# Patient Record
Sex: MALE | Race: WHITE | Employment: UNEMPLOYED | URBAN - METROPOLITAN AREA
[De-identification: names, ages, dates, MRNs, and addresses within clinical notes are randomized per-mention and may not be internally consistent; named-entity substitution may affect disease eponyms.]

---

## 2018-09-13 ENCOUNTER — APPOINTMENT (OUTPATIENT)
Dept: GENERAL RADIOLOGY | Age: 38
End: 2018-09-13
Payer: MEDICAID

## 2018-09-13 ENCOUNTER — HOSPITAL ENCOUNTER (EMERGENCY)
Age: 38
Discharge: HOME | End: 2018-09-13
Payer: MEDICAID

## 2018-09-13 VITALS
DIASTOLIC BLOOD PRESSURE: 76 MMHG | HEART RATE: 99 BPM | WEIGHT: 292 LBS | RESPIRATION RATE: 16 BRPM | BODY MASS INDEX: 37.47 KG/M2 | HEIGHT: 74 IN | TEMPERATURE: 97.9 F | OXYGEN SATURATION: 96 % | SYSTOLIC BLOOD PRESSURE: 138 MMHG

## 2018-09-13 DIAGNOSIS — S46.912A STRAIN OF LEFT SHOULDER, INITIAL ENCOUNTER: Primary | ICD-10-CM

## 2018-09-13 PROCEDURE — 99283 EMERGENCY DEPT VISIT LOW MDM: CPT

## 2018-09-13 PROCEDURE — 73030 X-RAY EXAM OF SHOULDER: CPT

## 2018-09-13 ASSESSMENT — PAIN DESCRIPTION - ORIENTATION
ORIENTATION: LEFT
ORIENTATION: LEFT

## 2018-09-13 ASSESSMENT — PAIN SCALES - GENERAL
PAINLEVEL_OUTOF10: 4
PAINLEVEL_OUTOF10: 4

## 2018-09-13 ASSESSMENT — PAIN DESCRIPTION - LOCATION
LOCATION: SHOULDER
LOCATION: SHOULDER

## 2018-09-13 ASSESSMENT — PAIN DESCRIPTION - DESCRIPTORS: DESCRIPTORS: ACHING

## 2018-09-13 ASSESSMENT — PAIN DESCRIPTION - PAIN TYPE
TYPE: ACUTE PAIN
TYPE: ACUTE PAIN

## 2018-09-16 NOTE — ED PROVIDER NOTES
Interpretation: Normal    Constitutional:  Alert, development consistent with age. Neck:  Normal ROM. Supple. Non-tender. Shoulder:  Left  global.              Tenderness: mild              Swelling: None. Deformity: no.              ROM: full range with pain. Skin:  tenderness and no erythema, rash or wounds noted. Neurovascular: Motor deficit: none. Sensory deficit: none. Pulse deficit: none. Capillary refill: normal.    Elbow:              Tenderness:  none. Swelling: None. Deformity: no.              ROM: full range of motion. Skin:  no erythema, rash or wounds noted. Lymphatics: No lymphangitis or edema noted  Neurological:  Oriented. Motor functions intact. Lab / Imaging Results   (All laboratory and radiology results have been personally reviewed by myself)  Labs:  No results found for this visit on 09/13/18. Imaging: All Radiology results interpreted by Radiologist unless otherwise noted. XR SHOULDER LEFT (MIN 2 VIEWS)   Final Result   No evidence of fracture or dislocation of the shoulder. ED Course / Medical Decision Making   Medications - No data to display         Consult(s):   None    Procedure(s):   none    MDM:   Films were obtained based on low  suspicion for bony injury as per history/physical findings . Plan is subsequently for symptom control, limited use and  immobilization with appropriate outpatient follow-up. Counseling: The emergency provider has spoken with the patient and discussed todays results, in addition to providing specific details for the plan of care and counseling regarding the diagnosis and prognosis. Questions are answered at this time and they are agreeable with the plan. Assessment      1.  Strain of left shoulder, initial encounter      Plan   Discharge to home  Patient condition is good    New Medications     Discharge

## 2021-01-05 LAB
AVERAGE GLUCOSE: NORMAL
HBA1C MFR BLD: 11.4 %

## 2021-06-23 LAB
AVERAGE GLUCOSE: NORMAL
HBA1C MFR BLD: 11.2 %

## 2021-09-27 ENCOUNTER — HOSPITAL ENCOUNTER (EMERGENCY)
Age: 41
Discharge: HOME | End: 2021-09-27
Payer: MEDICAID

## 2021-09-27 VITALS
HEART RATE: 85 BPM | BODY MASS INDEX: 35.94 KG/M2 | OXYGEN SATURATION: 94 % | RESPIRATION RATE: 16 BRPM | HEIGHT: 74 IN | DIASTOLIC BLOOD PRESSURE: 88 MMHG | WEIGHT: 280 LBS | SYSTOLIC BLOOD PRESSURE: 156 MMHG | TEMPERATURE: 96.8 F

## 2021-09-27 DIAGNOSIS — K04.7 DENTAL ABSCESS: Primary | ICD-10-CM

## 2021-09-27 PROCEDURE — 6360000002 HC RX W HCPCS: Performed by: NURSE PRACTITIONER

## 2021-09-27 PROCEDURE — 99282 EMERGENCY DEPT VISIT SF MDM: CPT

## 2021-09-27 PROCEDURE — 96372 THER/PROPH/DIAG INJ SC/IM: CPT

## 2021-09-27 RX ORDER — KETOROLAC TROMETHAMINE 30 MG/ML
30 INJECTION, SOLUTION INTRAMUSCULAR; INTRAVENOUS ONCE
Status: COMPLETED | OUTPATIENT
Start: 2021-09-27 | End: 2021-09-27

## 2021-09-27 RX ORDER — BUPRENORPHINE AND NALOXONE 8; 2 MG/1; MG/1
1 FILM, SOLUBLE BUCCAL; SUBLINGUAL DAILY
COMMUNITY

## 2021-09-27 RX ADMIN — KETOROLAC TROMETHAMINE 30 MG: 30 INJECTION, SOLUTION INTRAMUSCULAR at 11:28

## 2021-09-27 ASSESSMENT — PAIN SCALES - GENERAL
PAINLEVEL_OUTOF10: 8
PAINLEVEL_OUTOF10: 8

## 2021-09-27 ASSESSMENT — PAIN DESCRIPTION - DESCRIPTORS: DESCRIPTORS: STABBING;PRESSURE

## 2021-09-27 ASSESSMENT — PAIN DESCRIPTION - PAIN TYPE: TYPE: ACUTE PAIN

## 2021-09-27 ASSESSMENT — PAIN DESCRIPTION - ONSET: ONSET: SUDDEN

## 2021-09-27 ASSESSMENT — PAIN DESCRIPTION - FREQUENCY: FREQUENCY: CONTINUOUS

## 2021-09-27 ASSESSMENT — PAIN DESCRIPTION - PROGRESSION: CLINICAL_PROGRESSION: GRADUALLY WORSENING

## 2021-09-27 ASSESSMENT — PAIN DESCRIPTION - LOCATION: LOCATION: TEETH

## 2021-09-27 ASSESSMENT — PAIN DESCRIPTION - ORIENTATION: ORIENTATION: LEFT;LOWER

## 2021-09-27 NOTE — ED PROVIDER NOTES
[facility]  Department of Emergency Medicine   ED  Encounter Note  Admit Date/RoomTime: 2021 10:49 AM  ED Room: [facility]    NAME: Adriana Benavides  : 1980  MRN: [id number]     Chief Complaint:  Dental Pain (broken tooth left side bottom row. )    History of Present Illness        Adriana Benavides is a 46 y.o. old male who presents to the emergency department by private vehicle for sudden onset of non-traumatic left lower tooth pain, which occured 4 day(s) prior to arrival.  Since onset the symptoms have been gradually worsening and moderate in severity. Worsened by  chewing and improved by nothing. Associated Signs & Symptoms:  Facial pain and swelling. Patient reports taking clindamycin 600 mg 3 times a day since the incident with worsening of his symptoms. He states he had this prescription left over. He is a patient of [facility] dental and is unable to be seen because they are \"computers are down. \"            Onset:       Spontaneous:   yes. Following Trauma:   no.     Previous Caries:   yes. Recent Dental Procedure:   no.     ROS   Pertinent positives and negatives are stated within HPI, all other systems reviewed and are negative. Past Medical History:  has a past medical history of Cellulitis and abscess of hand, except fingers and thumb, Foreign body, and Tobacco abuse. Surgical History:  has a past surgical history that includes Ankle surgery (1998); Wrist surgery; Tonsillectomy; and Ankle surgery (Right). Social History:  reports that he has been smoking cigarettes. He has a 30.00 pack-year smoking history. He has never used smokeless tobacco. He reports previous drug use. He reports that he does not drink alcohol. Family History: family history is not on file. Allergies: Patient has no known allergies.     Physical Exam   Oxygen Saturation Interpretation: Normal.        ED Triage Vitals   BP Temp Temp Source Pulse Resp SpO2 Height Weight   09/27/21 0958 09/27/21 0958 09/27/21 0958 09/27/21 0958 09/27/21 0958 09/27/21 0958 09/27/21 1046 09/27/21 1046   (!) 156/88 96.8 °F (36 °C) Temporal 85 16 94 % 6' 2\" (1.88 m) 280 lb (127 kg)         Constitutional:  Alert, development consistent with age. HEENT:  NC/NT. Airway patent. Oral mucosa moist.  Neck:  Supple. Normal ROM. Lips:  upper and lower normal.  Mouth:  normal tongue and buccal mucosa and floor of the mouth soft. Dental:  In the area of #19 and #20 are decay and swelling with pain on manipulation and no spontaneous drainage to the swelling on the buccal aspect. Trismus: No.       Drooling: No.         Airway stridor: No.  Facial skin: left swelling without erythema or warmth. Respiratory:  Clear to auscultation and breath sounds equal. No respiratory distress. CV: Regular rate and rhythm, no resting tachycardia. Integument:  No rashes, erythema or lesions present, unless noted elsewhere. .  Lymphatics: No lymphangitis or adenopathy noted. Neurological:  Oriented. Motor functions intact. Lab / Imaging Results   (All laboratory and radiology results have been personally reviewed by myself)  Labs:  No results found for this visit on 09/27/21. Imaging: All Radiology results interpreted by Radiologist unless otherwise noted. No orders to display     ED Course / Medical Decision Making     Medications   ketorolac (TORADOL) injection 30 mg (30 mg IntraMUSCular Given 9/27/21 1128)        Consult(s):   Dental Resident was not consulted to see patient regarding complaint of her I did call the dental clinic for him to be seen this afternoon. Procedure(s):   None    Plan of Care/Counseling:  SONIA Colunga CNP reviewed today's visit with the patient in addition to providing specific details for the plan of care and counseling regarding the diagnosis and prognosis. Questions are answered at this time and are agreeable with the plan.   Given the patient has been on oral clindamycin since Friday and is now developed facial swelling, he was sent to the dental clinic to be evaluated for afternoon session. He is aware that he will need to wait and does not have an actual appointment. He was advised to be there at 1230 pm.  He was given an injection of Toradol prior to departure. Patient has no airway compromise, no respiratory distress and departed in stable condition. He is aware should he have any complication between now and then to seek reevaluation in the emergency department setting at [address] [place] [address] ED. Patient departed in stable condition. Assessment      1. Dental abscess      Plan   Discharged home. Patient condition is stable    New Medications     Discharge Medication List as of 9/27/2021 11:31 AM        Electronically signed by SONIA Moreira CNP   DD: 9/27/21  **This report was transcribed using voice recognition software. Every effort was made to ensure accuracy; however, inadvertent computerized transcription errors may be present.   END OF ED PROVIDER NOTE       SONIA Moreira CNP  09/27/21 SONIA Morillo CNP  09/27/21 1585

## 2022-07-12 VITALS
HEART RATE: 80 BPM | DIASTOLIC BLOOD PRESSURE: 74 MMHG | HEIGHT: 75 IN | BODY MASS INDEX: 39.04 KG/M2 | TEMPERATURE: 97.3 F | WEIGHT: 314 LBS | OXYGEN SATURATION: 92 % | SYSTOLIC BLOOD PRESSURE: 122 MMHG

## 2022-07-12 RX ORDER — INSULIN GLARGINE 100 [IU]/ML
60 INJECTION, SOLUTION SUBCUTANEOUS NIGHTLY
COMMUNITY
End: 2022-09-01 | Stop reason: SDUPTHER

## 2022-09-01 RX ORDER — INSULIN GLARGINE 100 [IU]/ML
60 INJECTION, SOLUTION SUBCUTANEOUS NIGHTLY
Qty: 5 ADJUSTABLE DOSE PRE-FILLED PEN SYRINGE | Refills: 2 | Status: SHIPPED | OUTPATIENT
Start: 2022-09-01 | End: 2022-10-01

## 2022-12-05 RX ORDER — INSULIN GLARGINE 100 [IU]/ML
60 INJECTION, SOLUTION SUBCUTANEOUS NIGHTLY
Qty: 15 ML | Refills: 2 | OUTPATIENT
Start: 2022-12-05 | End: 2023-01-04

## 2023-03-22 ENCOUNTER — HOSPITAL ENCOUNTER (EMERGENCY)
Age: 43
Discharge: HOME | End: 2023-03-22
Attending: EMERGENCY MEDICINE
Payer: MEDICAID

## 2023-03-22 VITALS
DIASTOLIC BLOOD PRESSURE: 92 MMHG | SYSTOLIC BLOOD PRESSURE: 136 MMHG | RESPIRATION RATE: 16 BRPM | OXYGEN SATURATION: 90 % | TEMPERATURE: 97.9 F | HEART RATE: 90 BPM

## 2023-03-22 DIAGNOSIS — K04.7 DENTAL INFECTION: Primary | ICD-10-CM

## 2023-03-22 DIAGNOSIS — K08.89 ODONTALGIA: ICD-10-CM

## 2023-03-22 DIAGNOSIS — K02.9 DENTAL DECAY: ICD-10-CM

## 2023-03-22 PROCEDURE — 6360000002 HC RX W HCPCS: Performed by: EMERGENCY MEDICINE

## 2023-03-22 PROCEDURE — 6370000000 HC RX 637 (ALT 250 FOR IP): Performed by: EMERGENCY MEDICINE

## 2023-03-22 PROCEDURE — 99284 EMERGENCY DEPT VISIT MOD MDM: CPT

## 2023-03-22 PROCEDURE — 96372 THER/PROPH/DIAG INJ SC/IM: CPT

## 2023-03-22 RX ORDER — KETOROLAC TROMETHAMINE 30 MG/ML
30 INJECTION, SOLUTION INTRAMUSCULAR; INTRAVENOUS ONCE
Status: COMPLETED | OUTPATIENT
Start: 2023-03-22 | End: 2023-03-22

## 2023-03-22 RX ORDER — AMOXICILLIN AND CLAVULANATE POTASSIUM 875; 125 MG/1; MG/1
1 TABLET, FILM COATED ORAL 2 TIMES DAILY
Qty: 14 TABLET | Refills: 0 | Status: SHIPPED | OUTPATIENT
Start: 2023-03-22 | End: 2023-03-29

## 2023-03-22 RX ORDER — AMOXICILLIN AND CLAVULANATE POTASSIUM 875; 125 MG/1; MG/1
1 TABLET, FILM COATED ORAL ONCE
Status: COMPLETED | OUTPATIENT
Start: 2023-03-22 | End: 2023-03-22

## 2023-03-22 RX ORDER — IBUPROFEN 800 MG/1
800 TABLET ORAL EVERY 6 HOURS PRN
Qty: 20 TABLET | Refills: 3 | Status: SHIPPED | OUTPATIENT
Start: 2023-03-22 | End: 2023-03-27

## 2023-03-22 RX ADMIN — AMOXICILLIN AND CLAVULANATE POTASSIUM 1 TABLET: 875; 125 TABLET, FILM COATED ORAL at 19:54

## 2023-03-22 RX ADMIN — KETOROLAC TROMETHAMINE 30 MG: 30 INJECTION, SOLUTION INTRAMUSCULAR at 19:54

## 2023-03-22 ASSESSMENT — PAIN - FUNCTIONAL ASSESSMENT
PAIN_FUNCTIONAL_ASSESSMENT: INTOLERABLE, UNABLE TO DO ANY ACTIVE OR PASSIVE ACTIVITIES
PAIN_FUNCTIONAL_ASSESSMENT: 0-10

## 2023-03-22 ASSESSMENT — LIFESTYLE VARIABLES
HOW OFTEN DO YOU HAVE A DRINK CONTAINING ALCOHOL: NEVER
HOW MANY STANDARD DRINKS CONTAINING ALCOHOL DO YOU HAVE ON A TYPICAL DAY: PATIENT DOES NOT DRINK

## 2023-03-22 ASSESSMENT — PAIN DESCRIPTION - ORIENTATION: ORIENTATION: RIGHT;LOWER

## 2023-03-22 ASSESSMENT — PAIN SCALES - GENERAL: PAINLEVEL_OUTOF10: 10

## 2023-03-22 ASSESSMENT — PAIN DESCRIPTION - FREQUENCY: FREQUENCY: CONTINUOUS

## 2023-03-22 ASSESSMENT — PAIN DESCRIPTION - ONSET: ONSET: ON-GOING

## 2023-03-22 ASSESSMENT — PAIN DESCRIPTION - DESCRIPTORS: DESCRIPTORS: ACHING

## 2023-03-22 ASSESSMENT — PAIN DESCRIPTION - LOCATION: LOCATION: TEETH

## 2023-03-22 ASSESSMENT — PAIN DESCRIPTION - PAIN TYPE: TYPE: ACUTE PAIN

## 2023-03-22 NOTE — DISCHARGE INSTRUCTIONS
NOTE:  The [facility] [facility]  (see information below) has \"Walk-In\" hours from 7:30 AM -8:30 AM  AND from 12:30 PM-1:30 PM

## 2023-03-22 NOTE — ED PROVIDER NOTES
HPI:  3/22/23, Time: 7:23 PM EDT      Marixa Sanabria is a 41 y.o. male presenting to the ED for, beginning several days ago. The complaint has been persistent, severe in severity, and worsened by nothing. Patient states he has history of dental caries but that one of his teeth just recently broke off exacerbating his chronic tooth ache pain. No other complaints are reported. No relieving factors. No bleeding or purulent drainage reported. Review of Systems:   A complete review of systems was performed and pertinent positives and negatives are stated within HPI, all other systems reviewed and are negative.    --------------------------------------------- PAST HISTORY ---------------------------------------------  Past Medical History:  has a past medical history of Cellulitis and abscess of hand, except fingers and thumb, Endocarditis, Foreign body, Tobacco abuse, and Type 2 diabetes mellitus without complication ([facility] [address] Past Surgical History:  has a past surgical history that includes Ankle surgery (1998); Wrist surgery; Ankle surgery (Right); and Tonsillectomy (1989). Social History:  reports that he has been smoking cigarettes. He has a 30.00 pack-year smoking history. He has never used smokeless tobacco. He reports that he does not currently use drugs. He reports that he does not drink alcohol. Family History: family history includes Heart Attack in his maternal grandfather, maternal grandmother, paternal grandfather, and paternal grandmother; High Blood Pressure in his maternal grandfather, maternal grandmother, paternal grandfather, and paternal grandmother. The patients home medications have been reviewed. Allergies: Patient has no known allergies.     -------------------------------------------------- RESULTS -------------------------------------------------  All laboratory and radiology results have been personally reviewed by myself   LABS:  No results found for this visit on

## 2023-06-18 ENCOUNTER — HOSPITAL ENCOUNTER (EMERGENCY)
Age: 43
Discharge: HOME | End: 2023-06-18
Payer: MEDICAID

## 2023-06-18 VITALS
BODY MASS INDEX: 34.65 KG/M2 | DIASTOLIC BLOOD PRESSURE: 84 MMHG | OXYGEN SATURATION: 92 % | RESPIRATION RATE: 20 BRPM | SYSTOLIC BLOOD PRESSURE: 145 MMHG | HEIGHT: 74 IN | HEART RATE: 99 BPM | WEIGHT: 270 LBS | TEMPERATURE: 97.4 F

## 2023-06-18 DIAGNOSIS — K08.89 PAIN, DENTAL: Primary | ICD-10-CM

## 2023-06-18 DIAGNOSIS — K02.9 DENTAL CARIES: ICD-10-CM

## 2023-06-18 PROCEDURE — 99284 EMERGENCY DEPT VISIT MOD MDM: CPT

## 2023-06-18 PROCEDURE — 6370000000 HC RX 637 (ALT 250 FOR IP): Performed by: NURSE PRACTITIONER

## 2023-06-18 PROCEDURE — 96372 THER/PROPH/DIAG INJ SC/IM: CPT

## 2023-06-18 PROCEDURE — 6360000002 HC RX W HCPCS: Performed by: NURSE PRACTITIONER

## 2023-06-18 RX ORDER — NAPROXEN 500 MG/1
500 TABLET ORAL 2 TIMES DAILY PRN
Qty: 60 TABLET | Refills: 0 | Status: SHIPPED | OUTPATIENT
Start: 2023-06-18

## 2023-06-18 RX ORDER — LIDOCAINE HYDROCHLORIDE 20 MG/ML
15 SOLUTION OROPHARYNGEAL
Qty: 100 ML | Refills: 0 | Status: SHIPPED | OUTPATIENT
Start: 2023-06-18

## 2023-06-18 RX ORDER — KETOROLAC TROMETHAMINE 30 MG/ML
30 INJECTION, SOLUTION INTRAMUSCULAR; INTRAVENOUS ONCE
Status: COMPLETED | OUTPATIENT
Start: 2023-06-18 | End: 2023-06-18

## 2023-06-18 RX ORDER — CHLORHEXIDINE GLUCONATE 0.12 MG/ML
15 RINSE ORAL 2 TIMES DAILY
Qty: 420 ML | Refills: 0 | Status: SHIPPED | OUTPATIENT
Start: 2023-06-18 | End: 2023-07-02

## 2023-06-18 RX ORDER — AMOXICILLIN AND CLAVULANATE POTASSIUM 875; 125 MG/1; MG/1
1 TABLET, FILM COATED ORAL ONCE
Status: COMPLETED | OUTPATIENT
Start: 2023-06-18 | End: 2023-06-18

## 2023-06-18 RX ORDER — AMOXICILLIN AND CLAVULANATE POTASSIUM 875; 125 MG/1; MG/1
1 TABLET, FILM COATED ORAL 2 TIMES DAILY
Qty: 20 TABLET | Refills: 0 | Status: SHIPPED | OUTPATIENT
Start: 2023-06-18 | End: 2023-06-28

## 2023-06-18 RX ADMIN — AMOXICILLIN AND CLAVULANATE POTASSIUM 1 TABLET: 875; 125 TABLET, FILM COATED ORAL at 16:39

## 2023-06-18 RX ADMIN — KETOROLAC TROMETHAMINE 30 MG: 30 INJECTION, SOLUTION INTRAMUSCULAR; INTRAVENOUS at 16:39

## 2023-06-18 ASSESSMENT — PAIN SCALES - GENERAL
PAINLEVEL_OUTOF10: 10
PAINLEVEL_OUTOF10: 10

## 2023-06-18 ASSESSMENT — PAIN DESCRIPTION - FREQUENCY: FREQUENCY: CONTINUOUS

## 2023-06-18 ASSESSMENT — PAIN DESCRIPTION - PAIN TYPE: TYPE: ACUTE PAIN

## 2023-06-18 ASSESSMENT — PAIN DESCRIPTION - ONSET: ONSET: SUDDEN

## 2023-06-18 ASSESSMENT — PAIN DESCRIPTION - DESCRIPTORS: DESCRIPTORS: BURNING;DISCOMFORT

## 2023-06-18 ASSESSMENT — PAIN - FUNCTIONAL ASSESSMENT: PAIN_FUNCTIONAL_ASSESSMENT: 0-10

## 2023-06-18 ASSESSMENT — PAIN DESCRIPTION - LOCATION: LOCATION: TEETH

## 2023-06-19 NOTE — ED PROVIDER NOTES
states that he has had dental pain for quite some time. Patient states that he cannot for tooth extractions. he states that he has had some significant tooth decay to the right lower dentition states that he had a tooth break off while she was eating several days ago. Patient states that he has increased pain. he denies any bleeding or purulent drainage. Patient denies any difficulty breathing chewing or swallowing. Patient denies any fevers. Differential diagnosis: Dentalgia, tooth decay, dental abscess. At this time symptoms improved with treatment. Patient at this time has no evidence of dental abscess. Testing was not considered obtained patient is afebrile. Patient was given 1 dose of oral antibiotics in the emergency department and placed on antibiotics for home. Patient educated on close outpatient follow-up and was referred to the dental clinic. Patient's questions were answered. Patient is nontoxic in appearance she is hemodynamically stable neurologically vascular muscularly intact and stable for discharge to home. I am the Primary Clinician of Record. FINAL IMPRESSION      1. Pain, dental    2.  Dental caries          DISPOSITION/PLAN     DISPOSITION Decision To Discharge 06/18/2023 04:45:17 PM      PATIENT REFERRED TO:  [facility] primary care  [phone]  Schedule an appointment as soon as possible for a visit in 2 days        DISCHARGE MEDICATIONS:  Discharge Medication List as of 6/18/2023  4:47 PM        START taking these medications    Details   amoxicillin-clavulanate (AUGMENTIN) 875-125 MG per tablet Take 1 tablet by mouth 2 times daily for 10 days, Disp-20 tablet, R-0Normal      naproxen (NAPROSYN) 500 MG tablet Take 1 tablet by mouth 2 times daily as needed for Pain, Disp-60 tablet, R-0Normal      chlorhexidine (PERIDEX) 0.12 % solution Swish and spit 15 mLs 2 times daily for 14 days, Disp-420 mL, R-0Normal      lidocaine viscous hcl (XYLOCAINE) 2 % SOLN

## 2023-11-22 ENCOUNTER — HOSPITAL ENCOUNTER (EMERGENCY)
Age: 43
Discharge: LEFT BEFORE DISCHARGE | End: 2023-11-22
Attending: EMERGENCY MEDICINE
Payer: MEDICAID

## 2023-11-22 ENCOUNTER — APPOINTMENT (OUTPATIENT)
Dept: CT IMAGING | Age: 43
End: 2023-11-22
Payer: MEDICAID

## 2023-11-22 VITALS
RESPIRATION RATE: 24 BRPM | OXYGEN SATURATION: 98 % | SYSTOLIC BLOOD PRESSURE: 142 MMHG | HEART RATE: 124 BPM | DIASTOLIC BLOOD PRESSURE: 85 MMHG | TEMPERATURE: 97.5 F

## 2023-11-22 DIAGNOSIS — S81.802A WOUND OF LEFT LOWER EXTREMITY, INITIAL ENCOUNTER: ICD-10-CM

## 2023-11-22 DIAGNOSIS — S92.002A CLOSED DISPLACED FRACTURE OF LEFT CALCANEUS, UNSPECIFIED PORTION OF CALCANEUS, INITIAL ENCOUNTER: Primary | ICD-10-CM

## 2023-11-22 DIAGNOSIS — R73.9 HYPERGLYCEMIA: ICD-10-CM

## 2023-11-22 LAB
BILIRUB UR QL STRIP: NEGATIVE
CHP ED QC CHECK: YES
CLARITY UR: ABNORMAL
COLOR UR: YELLOW
EPI CELLS #/AREA URNS HPF: ABNORMAL /HPF
GLUCOSE BLD-MCNC: >500 MG/DL (ref 74–99)
GLUCOSE UR STRIP-MCNC: >=1000 MG/DL
HGB UR QL STRIP.AUTO: ABNORMAL
KETONES UR STRIP-MCNC: ABNORMAL MG/DL
LEUKOCYTE ESTERASE UR QL STRIP: ABNORMAL
NITRITE UR QL STRIP: NEGATIVE
PH UR STRIP: 5.5 [PH] (ref 5–9)
PROT UR STRIP-MCNC: 100 MG/DL
RBC #/AREA URNS HPF: ABNORMAL /HPF
SP GR UR STRIP: 1.02 (ref 1–1.03)
UROBILINOGEN UR STRIP-ACNC: 1 EU/DL (ref 0–1)
WBC #/AREA URNS HPF: ABNORMAL /HPF
YEAST URNS QL MICRO: PRESENT

## 2023-11-22 PROCEDURE — 99284 EMERGENCY DEPT VISIT MOD MDM: CPT

## 2023-11-22 PROCEDURE — 81001 URINALYSIS AUTO W/SCOPE: CPT

## 2023-11-22 PROCEDURE — 73700 CT LOWER EXTREMITY W/O DYE: CPT

## 2023-11-22 PROCEDURE — 82962 GLUCOSE BLOOD TEST: CPT

## 2023-11-22 PROCEDURE — 93005 ELECTROCARDIOGRAM TRACING: CPT | Performed by: NURSE PRACTITIONER

## 2023-11-22 RX ORDER — IBUPROFEN 400 MG/1
400 TABLET ORAL EVERY 6 HOURS PRN
Qty: 28 TABLET | Refills: 0 | Status: SHIPPED | OUTPATIENT
Start: 2023-11-22 | End: 2023-11-29

## 2023-11-22 ASSESSMENT — PAIN - FUNCTIONAL ASSESSMENT: PAIN_FUNCTIONAL_ASSESSMENT: NONE - DENIES PAIN

## 2023-11-22 NOTE — ED NOTES
Department of Emergency Medicine  FIRST PROVIDER TRIAGE NOTE             Independent MLP           11/22/23  6:39 PM EST    Date of Encounter: 11/22/23   MRN: [id number]      HPI: Elmira Rouse is a 41 y.o. male who presents to the ED for Foot Injury Cade Michel off a roof 2 weeks ago, \"about 2.5 stories. \"  Had xray at Urgent Care that showed it is broken. They said to come here for cat scan. Large amount of swelling. )   Patient states blood sugars have been running hi  ROS: Negative for cp or sob. PE: Gen Appearance/Constitutional: alert  HEENT: NC/NT. PERRLA,  Airway patent. Initial Plan of Care: All treatment areas with department are currently occupied. Plan to order/Initiate the following while awaiting opening in ED: labs, EKG, and imaging studies.   Initiate Treatment-Testing, Proceed toTreatment Area When Bed Available for ED Attending/MLP to Continue Care    Electronically signed by SONIA Meléndez CNP   DD: 11/22/23       SONIA Burciaga CNP  11/22/23 0091

## 2023-11-23 ASSESSMENT — ENCOUNTER SYMPTOMS
VOMITING: 0
SHORTNESS OF BREATH: 0
ABDOMINAL PAIN: 0
EYE REDNESS: 0
NAUSEA: 0

## 2023-11-23 NOTE — ED NOTES
Patient refused to have his blood sugar taken care of, He stated he was here for his foot only,  His blood sugar runs over 500 all the time. Patient refused to have blood work completed and refused to have IV. Patient stated that he is fine and just wants ct of his foot. Nothing else.   CT completed and patient signed Claudine Acevedo RN  11/22/23 1690

## 2023-11-23 NOTE — ED NOTES
FS BGL HI, out of reportable range. Patent stated his blood sugar is always high  over 300. He also stated he isn't being admitted, isn't here for for his blood sugar,  just for a CT scan for his foot. SEE Calderón notified.       Chirag   11/22/23 1915

## 2023-11-23 NOTE — ED NOTES
Dressing applied to leg of pt. Pt tolerated well. Tall walking boot applied to left leg. Pt tolerated well. Instructed on care of leg and boot. Pt refused crutches stating he had his scooter.       María Heller  11/22/23 8960

## 2023-11-24 LAB
EKG ATRIAL RATE: 109 BPM
EKG P AXIS: 72 DEGREES
EKG P-R INTERVAL: 160 MS
EKG Q-T INTERVAL: 324 MS
EKG QRS DURATION: 86 MS
EKG QTC CALCULATION (BAZETT): 436 MS
EKG R AXIS: 59 DEGREES
EKG T AXIS: 57 DEGREES
EKG VENTRICULAR RATE: 109 BPM

## 2023-11-24 PROCEDURE — 93010 ELECTROCARDIOGRAM REPORT: CPT | Performed by: INTERNAL MEDICINE

## 2023-11-27 ENCOUNTER — TELEPHONE (OUTPATIENT)
Dept: ORTHOPEDIC SURGERY | Age: 43
End: 2023-11-27

## 2023-11-27 NOTE — TELEPHONE ENCOUNTER
The patient called the office to schedule appointment. I notified him that Rony Amaral did call us but we were waiting for provider's recommendations and we would call him back to schedule. He verbalized understanding.

## 2023-11-27 NOTE — TELEPHONE ENCOUNTER
Call placed to patient at this time. Appointment scheduled at this time. While speaking with patient he disclosed the following:  Wound present for 3 years  DOI a few weeks prior to CT on 11/22/2023.      Future Appointments   Date Time Provider [address]   12/8/2023 10:15 AM SCHEDULE,  ORTHO RES  [facility]

## 2023-11-27 NOTE — TELEPHONE ENCOUNTER
Message received from [address] staff for patient to be scheduled with Ortho Trauma. CT Scan 11/22/2023  IMPRESSION:  Severely comminuted calcaneal fracture. Circumferential soft tissue swelling. Routing to providers for scheduling recommendations. No future appointments.

## 2023-12-08 ENCOUNTER — OFFICE VISIT (OUTPATIENT)
Dept: ORTHOPEDIC SURGERY | Age: 43
End: 2023-12-08
Payer: MEDICAID

## 2023-12-08 ENCOUNTER — HOSPITAL ENCOUNTER (OUTPATIENT)
Dept: GENERAL RADIOLOGY | Age: 43
End: 2023-12-08
Payer: MEDICAID

## 2023-12-08 VITALS — WEIGHT: 270.06 LBS | BODY MASS INDEX: 34.66 KG/M2 | HEIGHT: 74 IN

## 2023-12-08 DIAGNOSIS — S92.062A CLOSED DISPLACED INTRA-ARTICULAR FRACTURE OF LEFT CALCANEUS, INITIAL ENCOUNTER: Primary | ICD-10-CM

## 2023-12-08 DIAGNOSIS — S92.062A CLOSED DISPLACED INTRA-ARTICULAR FRACTURE OF LEFT CALCANEUS, INITIAL ENCOUNTER: ICD-10-CM

## 2023-12-08 PROCEDURE — 73630 X-RAY EXAM OF FOOT: CPT

## 2023-12-08 PROCEDURE — 73650 X-RAY EXAM OF HEEL: CPT

## 2023-12-08 PROCEDURE — 99213 OFFICE O/P EST LOW 20 MIN: CPT | Performed by: ORTHOPAEDIC SURGERY

## 2023-12-08 NOTE — PROGRESS NOTES
Chief Complaint   Patient presents with    Follow-up     Patient here for left calc fx. Patient states pain lvl 4        SUBJECTIVE: She is following in office 5 weeks after falling off of a ladder and sustaining a left calcaneus fracture. Patient was seen in [address] [place] [address] Dr DAILEY 3 weeks ago where he was placed in a boot and told to follow-up in clinic. Patient also states he follows with podiatry and wound care clinic for chronic wound to his left calf. Patient does state he has uncontrolled diabetes as well as he is a smoker. Patient states he is in a minimal amount of pain today. 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 Patient states he is maintained nonweightbearing compliance with his boot. Patient is not on any DVT prophylaxis currently. Patient has no new orthopedic complaints today. Review of Systems -   General ROS: negative for - chills, fatigue, fever or night sweats  Respiratory ROS: no cough, shortness of breath, or wheezing  Cardiovascular ROS: no chest pain or dyspnea on exertion  Gastrointestinal ROS: no abdominal pain, change in bowel habits, or black or bloody stools  Genitourinary: no hematuria, dysuria, or incontinence   Musculoskeletal ROS:see above  Neurological ROS: no TIA or stroke symptoms       OBJECTIVE:   Alert and oriented X 3, no acute distress, respirations easy and unlabored with no audible wheezes, skin warm and dry, speech and dress appropriate for noted age, affect euthymic. Extremity:  Left lower extremity:  +2/4 DP PT pulse good cap refill extremity warm perfused  Compartment soft and compressible, calf supple nontender  Noticeable wound to his left calf. Skin around foot is very friable and swollen. Patient also has chronic fungal infections of all toes.   Positive EHL/DF/PF  Distal sensation to L4-S1 dermatomes diminished although patient states this is normal at baseline    XR: 12/8/23   Left foot/calcaneus: Demonstrates comminuted calcaneus fracture

## 2024-01-05 ENCOUNTER — APPOINTMENT (OUTPATIENT)
Dept: WOUND CARE | Facility: CLINIC | Age: 44
End: 2024-01-05
Payer: MEDICAID

## 2024-01-05 ENCOUNTER — APPOINTMENT (OUTPATIENT)
Dept: WOUND CARE | Facility: CLINIC | Age: 44
End: 2024-01-05

## 2024-01-08 ENCOUNTER — HOSPITAL ENCOUNTER (OUTPATIENT)
Dept: GENERAL RADIOLOGY | Age: 44
Discharge: HOME | End: 2024-01-10
Payer: MEDICAID

## 2024-01-08 ENCOUNTER — OFFICE VISIT (OUTPATIENT)
Dept: ORTHOPEDIC SURGERY | Age: 44
End: 2024-01-08
Payer: MEDICAID

## 2024-01-08 DIAGNOSIS — T14.8XXA CHRONIC WOUND: ICD-10-CM

## 2024-01-08 DIAGNOSIS — S92.062A CLOSED DISPLACED INTRA-ARTICULAR FRACTURE OF LEFT CALCANEUS, INITIAL ENCOUNTER: Primary | ICD-10-CM

## 2024-01-08 DIAGNOSIS — R52 PAIN: ICD-10-CM

## 2024-01-08 PROCEDURE — 99213 OFFICE O/P EST LOW 20 MIN: CPT

## 2024-01-08 PROCEDURE — 99213 OFFICE O/P EST LOW 20 MIN: CPT | Performed by: PHYSICIAN ASSISTANT

## 2024-01-08 PROCEDURE — 73650 X-RAY EXAM OF HEEL: CPT

## 2024-01-08 PROCEDURE — 4004F PT TOBACCO SCREEN RCVD TLK: CPT | Performed by: PHYSICIAN ASSISTANT

## 2024-01-08 PROCEDURE — G8427 DOCREV CUR MEDS BY ELIG CLIN: HCPCS | Performed by: PHYSICIAN ASSISTANT

## 2024-01-08 PROCEDURE — G8484 FLU IMMUNIZE NO ADMIN: HCPCS | Performed by: PHYSICIAN ASSISTANT

## 2024-01-08 PROCEDURE — G8417 CALC BMI ABV UP PARAM F/U: HCPCS | Performed by: PHYSICIAN ASSISTANT

## 2024-01-08 PROCEDURE — 73630 X-RAY EXAM OF FOOT: CPT

## 2024-01-08 RX ORDER — IBUPROFEN 800 MG/1
800 TABLET ORAL 3 TIMES DAILY PRN
Qty: 90 TABLET | Refills: 0 | Status: SHIPPED | OUTPATIENT
Start: 2024-01-08

## 2024-01-08 RX ORDER — ERGOCALCIFEROL 1.25 MG/1
50000 CAPSULE ORAL WEEKLY
Qty: 12 CAPSULE | Refills: 1 | Status: SHIPPED | OUTPATIENT
Start: 2024-01-08

## 2024-01-08 RX ORDER — PHENOL 1.4 %
1 AEROSOL, SPRAY (ML) MUCOUS MEMBRANE DAILY
Qty: 30 TABLET | Refills: 3 | Status: SHIPPED | OUTPATIENT
Start: 2024-01-08

## 2024-01-08 NOTE — PATIENT INSTRUCTIONS
Nonweightbearing left lower extremity.    Continue treatment at wound care clinic for left leg wound    Continue daily dressing changes to left leg wound    Calcium and vitamin D will be sent to your pharmacy.    Aspirin 325 mg twice daily for DVT prophylaxis.    Ibuprofen 800s will be sent to your pharmacy.    Discussed the importance of good diabetic control and smoking cessation to help with healing    Follow-up in 3 weeks with Dr. Harden for reevaluation, x-rays and possible progression of weightbearing status.    Call if any questions or concerns.

## 2024-01-08 NOTE — PROGRESS NOTES
Chief Complaint   Patient presents with    Follow-up     Five week f/u LT calc fx, DOI 11/6/23.  Foot feels the same.         SUBJECTIVE: Sachin Guo is a 43-year-old male who presents for follow-up after falling off a ladder sustaining a left calcaneus fracture.  DOI 11/6/2023.  He is now 9 weeks out from injury.  He has been NWB in the boot.  Patient also states he follows with podiatry and wound care clinic for chronic wound to his left calf.  Patient does state that he has uncontrolled diabetes as well as he is a smoker.  States he continues to smoke about 1.5 packs/day.  Patient states he is in a minimal amount of pain today.  He does state that he has bad neuropathy to bilateral feet.  Patient is not on any DVT prophylaxis currently.    Review of Systems -   General ROS: negative for - chills, fatigue, fever or night sweats  Respiratory ROS: no cough, shortness of breath, or wheezing  Cardiovascular ROS: no chest pain or dyspnea on exertion  Gastrointestinal ROS: no abdominal pain, change in bowel habits, or black or bloody stools  Genitourinary: no hematuria, dysuria, or incontinence   Musculoskeletal ROS:see above  Neurological ROS: no TIA or stroke symptoms     OBJECTIVE:   Alert and oriented X 3, no acute distress, respirations easy and unlabored with no audible wheezes, skin warm and dry, speech and dress appropriate for noted age, affect euthymic.    Extremity:  Left Lower Extremity  Skin clean dry and intact, without signs of infection  Incisions well approximated without signs of redness, warmth or drainage- sutures intact  Mild edema noted  Compartments supple throughout thigh and leg  Calf supple and nontender  Demonstrates active knee flexion/extension, ankle plantar/dorsiflexion/great toe extension.   States sensation intact to touch in sural/deep peroneal/superficial peroneal/saphenous/posterior tibial nerve distributions to foot/ankle.   Palpable dorsalis pedis and posterior tibialis pulses, cap

## 2024-01-10 ENCOUNTER — OFFICE VISIT (OUTPATIENT)
Dept: WOUND CARE | Facility: CLINIC | Age: 44
End: 2024-01-10
Payer: MEDICAID

## 2024-01-10 ENCOUNTER — APPOINTMENT (OUTPATIENT)
Dept: WOUND CARE | Facility: CLINIC | Age: 44
End: 2024-01-10
Payer: MEDICAID

## 2024-01-10 PROCEDURE — 99214 OFFICE O/P EST MOD 30 MIN: CPT | Mod: 25

## 2024-01-15 ENCOUNTER — APPOINTMENT (OUTPATIENT)
Dept: WOUND CARE | Facility: CLINIC | Age: 44
End: 2024-01-15
Payer: MEDICAID

## 2024-01-24 DIAGNOSIS — S92.062A CLOSED DISPLACED INTRA-ARTICULAR FRACTURE OF LEFT CALCANEUS, INITIAL ENCOUNTER: Primary | ICD-10-CM

## 2024-01-25 ENCOUNTER — OFFICE VISIT (OUTPATIENT)
Dept: ORTHOPEDIC SURGERY | Age: 44
End: 2024-01-25
Payer: MEDICAID

## 2024-01-25 ENCOUNTER — HOSPITAL ENCOUNTER (OUTPATIENT)
Dept: GENERAL RADIOLOGY | Age: 44
Discharge: HOME | End: 2024-01-27
Payer: MEDICAID

## 2024-01-25 VITALS — WEIGHT: 270.06 LBS | HEIGHT: 74 IN | BODY MASS INDEX: 34.66 KG/M2

## 2024-01-25 DIAGNOSIS — S92.062A CLOSED DISPLACED INTRA-ARTICULAR FRACTURE OF LEFT CALCANEUS, INITIAL ENCOUNTER: ICD-10-CM

## 2024-01-25 DIAGNOSIS — S92.062A CLOSED DISPLACED INTRA-ARTICULAR FRACTURE OF LEFT CALCANEUS, INITIAL ENCOUNTER: Primary | ICD-10-CM

## 2024-01-25 PROCEDURE — 99212 OFFICE O/P EST SF 10 MIN: CPT

## 2024-01-25 PROCEDURE — G8484 FLU IMMUNIZE NO ADMIN: HCPCS | Performed by: ORTHOPAEDIC SURGERY

## 2024-01-25 PROCEDURE — 73650 X-RAY EXAM OF HEEL: CPT

## 2024-01-25 PROCEDURE — 99213 OFFICE O/P EST LOW 20 MIN: CPT | Performed by: ORTHOPAEDIC SURGERY

## 2024-01-25 PROCEDURE — 4004F PT TOBACCO SCREEN RCVD TLK: CPT | Performed by: ORTHOPAEDIC SURGERY

## 2024-01-25 PROCEDURE — G8417 CALC BMI ABV UP PARAM F/U: HCPCS | Performed by: ORTHOPAEDIC SURGERY

## 2024-01-25 PROCEDURE — 73630 X-RAY EXAM OF FOOT: CPT

## 2024-01-25 PROCEDURE — G8427 DOCREV CUR MEDS BY ELIG CLIN: HCPCS | Performed by: ORTHOPAEDIC SURGERY

## 2024-01-25 NOTE — PROGRESS NOTES
Chief Complaint   Patient presents with    Follow-up     Patient here for  7 wk f/u Left calc fx, DOI approx: 11/6/23 ED visit 11/22/23. Patient states pain lvl is a 2         SUBJECTIVE: Sachin Guo is a 43-year-old male who presents for follow-up after falling off a ladder sustaining a left calcaneus fracture.  DOI 11/6/2023.  He is now approximately 12 weeks out from injury.  He has been NWB and presents with regular shoes today along with his knee scooter.  Patient also states he follows with podiatry and wound care clinic for chronic wound to his left leg, has this wrapped today.  Patient does state that he has uncontrolled diabetes as well as he is a smoker.  States he continues to smoke about 1.5 packs/day.  Patient states he has no pain today.  He does state that he has bad neuropathy to bilateral feet.  Patient is not on any DVT prophylaxis currently.    Review of Systems -   General ROS: negative for - chills, fatigue, fever or night sweats  Respiratory ROS: no cough, shortness of breath, or wheezing  Cardiovascular ROS: no chest pain or dyspnea on exertion  Gastrointestinal ROS: no abdominal pain, change in bowel habits, or black or bloody stools  Genitourinary: no hematuria, dysuria, or incontinence   Musculoskeletal ROS:see above  Neurological ROS: no TIA or stroke symptoms     OBJECTIVE:   Alert and oriented X 3, no acute distress, respirations easy and unlabored with no audible wheezes, skin warm and dry, speech and dress appropriate for noted age, affect euthymic.    Extremity:  Left Lower Extremity  Moderate generalized edema, symmetrical to contralateral limb  Wound over leg is wrapped, this was not unwrapped as wound care is managing this  Foot without any open wounds or ulcerations, significant scaling throughout the entire skin  Calcaneus is nontender  Moderate swelling to the hindfoot  Compartments supple throughout thigh and leg  Calf supple and nontender  Demonstrates active knee

## 2024-02-20 ENCOUNTER — HOSPITAL ENCOUNTER (EMERGENCY)
Facility: HOSPITAL | Age: 44
Discharge: HOME | End: 2024-02-20
Attending: STUDENT IN AN ORGANIZED HEALTH CARE EDUCATION/TRAINING PROGRAM
Payer: MEDICAID

## 2024-02-20 VITALS
HEART RATE: 84 BPM | DIASTOLIC BLOOD PRESSURE: 96 MMHG | WEIGHT: 280 LBS | SYSTOLIC BLOOD PRESSURE: 153 MMHG | BODY MASS INDEX: 34.82 KG/M2 | HEIGHT: 75 IN | OXYGEN SATURATION: 97 % | RESPIRATION RATE: 16 BRPM | TEMPERATURE: 97.8 F

## 2024-02-20 DIAGNOSIS — K08.89 PAIN, DENTAL: Primary | ICD-10-CM

## 2024-02-20 DIAGNOSIS — K02.9 PAIN DUE TO DENTAL CARIES: ICD-10-CM

## 2024-02-20 DIAGNOSIS — S92.062A CLOSED DISPLACED INTRA-ARTICULAR FRACTURE OF LEFT CALCANEUS, INITIAL ENCOUNTER: Primary | ICD-10-CM

## 2024-02-20 PROCEDURE — 96372 THER/PROPH/DIAG INJ SC/IM: CPT

## 2024-02-20 PROCEDURE — 2500000002 HC RX 250 W HCPCS SELF ADMINISTERED DRUGS (ALT 637 FOR MEDICARE OP, ALT 636 FOR OP/ED): Performed by: STUDENT IN AN ORGANIZED HEALTH CARE EDUCATION/TRAINING PROGRAM

## 2024-02-20 PROCEDURE — 2500000004 HC RX 250 GENERAL PHARMACY W/ HCPCS (ALT 636 FOR OP/ED): Performed by: STUDENT IN AN ORGANIZED HEALTH CARE EDUCATION/TRAINING PROGRAM

## 2024-02-20 PROCEDURE — 2500000005 HC RX 250 GENERAL PHARMACY W/O HCPCS: Performed by: STUDENT IN AN ORGANIZED HEALTH CARE EDUCATION/TRAINING PROGRAM

## 2024-02-20 PROCEDURE — 99283 EMERGENCY DEPT VISIT LOW MDM: CPT

## 2024-02-20 RX ORDER — ACETAMINOPHEN 500 MG
1000 TABLET ORAL EVERY 8 HOURS PRN
Qty: 30 TABLET | Refills: 0 | Status: SHIPPED | OUTPATIENT
Start: 2024-02-20 | End: 2024-02-25

## 2024-02-20 RX ORDER — PENICILLIN V POTASSIUM 250 MG/1
500 TABLET, FILM COATED ORAL ONCE
Status: COMPLETED | OUTPATIENT
Start: 2024-02-20 | End: 2024-02-20

## 2024-02-20 RX ORDER — PENICILLIN V POTASSIUM 500 MG/1
500 TABLET, FILM COATED ORAL 4 TIMES DAILY
Qty: 28 TABLET | Refills: 0 | Status: SHIPPED | OUTPATIENT
Start: 2024-02-20 | End: 2024-02-27

## 2024-02-20 RX ORDER — BUPIVACAINE HYDROCHLORIDE 5 MG/ML
3 INJECTION, SOLUTION EPIDURAL; INTRACAUDAL ONCE
Status: COMPLETED | OUTPATIENT
Start: 2024-02-20 | End: 2024-02-20

## 2024-02-20 RX ORDER — LIDOCAINE HYDROCHLORIDE 10 MG/ML
3 INJECTION INFILTRATION; PERINEURAL ONCE
Status: COMPLETED | OUTPATIENT
Start: 2024-02-20 | End: 2024-02-20

## 2024-02-20 RX ORDER — KETOROLAC TROMETHAMINE 30 MG/ML
30 INJECTION, SOLUTION INTRAMUSCULAR; INTRAVENOUS ONCE
Status: COMPLETED | OUTPATIENT
Start: 2024-02-20 | End: 2024-02-20

## 2024-02-20 RX ORDER — NAPROXEN 500 MG/1
500 TABLET ORAL
Qty: 10 TABLET | Refills: 0 | Status: SHIPPED | OUTPATIENT
Start: 2024-02-20 | End: 2024-02-25

## 2024-02-20 RX ADMIN — LIDOCAINE HYDROCHLORIDE 30 MG: 10 INJECTION, SOLUTION INFILTRATION; PERINEURAL at 21:50

## 2024-02-20 RX ADMIN — BUPIVACAINE HYDROCHLORIDE 15 MG: 5 INJECTION, SOLUTION EPIDURAL; INTRACAUDAL; PERINEURAL at 21:50

## 2024-02-20 RX ADMIN — PENICILLIN V POTASSIUM 500 MG: 250 TABLET, FILM COATED ORAL at 21:57

## 2024-02-20 RX ADMIN — KETOROLAC TROMETHAMINE 30 MG: 30 INJECTION, SOLUTION INTRAMUSCULAR; INTRAVENOUS at 22:10

## 2024-02-20 ASSESSMENT — PAIN DESCRIPTION - LOCATION: LOCATION: TEETH

## 2024-02-20 ASSESSMENT — PAIN SCALES - GENERAL: PAINLEVEL_OUTOF10: 10 - WORST POSSIBLE PAIN

## 2024-02-20 ASSESSMENT — PAIN - FUNCTIONAL ASSESSMENT: PAIN_FUNCTIONAL_ASSESSMENT: 0-10

## 2024-02-21 NOTE — ED PROVIDER NOTES
HPI   Chief Complaint   Patient presents with    Dental Pain     X 2 days       Emi Villalta is a 43 y.o. male with no significant past medical history presenting to the emergency department for left upper molar and premolar tooth pain.  The patient states that he previously had multiple right upper molar/premolar teeth pulled this past week but today began to have severe pain on the left side.  The patient called his dentist who informed him that he was out of town and recommended he come to the emergency department for a dental block, pain medications, and antibiotics for an infection. The patient endorses a mild headache with his tooth pain.  He states that his tooth pain is a 10/10 and describes it as aching.  He denies any other symptoms with this pain.                            [place] Coma Scale Score: 15                     Patient History   Past Medical History:   Diagnosis Date    Headache, unspecified     Bad headache    Personal history of diseases of the skin and subcutaneous tissue     History of psoriasis     Past Surgical History:   Procedure Laterality Date    IR CVC TUNNELED  12/4/2018    IR CVC TUNNELED 12/4/2018 [facility]     No family history on file.  Social History     Tobacco Use    Smoking status: Not on file    Smokeless tobacco: Not on file   Substance Use Topics    Alcohol use: Not on file    Drug use: Not on file       Physical Exam   ED Triage Vitals [02/20/24 1750]   Temperature Heart Rate Respirations BP   36.6 °C (97.8 °F) 84 16 (!) 153/96      Pulse Ox Temp src Heart Rate Source Patient Position   97 % -- Monitor Sitting      BP Location FiO2 (%)     Right arm --       Physical Exam  Vitals and nursing note reviewed.   Constitutional:       General: He is not in acute distress.     Appearance: He is well-developed.   HENT:      Head: Normocephalic and atraumatic.      Right Ear: External ear normal.      Left Ear: External ear normal.      Nose: Nose normal.       Mouth/Throat:      Mouth: Mucous membranes are moist.      Dentition: Dental caries present.      Pharynx: Oropharynx is clear.     Eyes:      Conjunctiva/sclera: Conjunctivae normal.      Pupils: Pupils are equal, round, and reactive to light.   Cardiovascular:      Rate and Rhythm: Normal rate and regular rhythm.      Heart sounds: No murmur heard.  Pulmonary:      Effort: Pulmonary effort is normal. No respiratory distress.      Breath sounds: Normal breath sounds.   Abdominal:      Palpations: Abdomen is soft.      Tenderness: There is no abdominal tenderness.   Musculoskeletal:         General: No swelling.      Cervical back: Neck supple.   Skin:     General: Skin is warm and dry.      Capillary Refill: Capillary refill takes less than 2 seconds.   Neurological:      General: No focal deficit present.      Mental Status: He is alert and oriented to person, place, and time. Mental status is at baseline.   Psychiatric:         Mood and Affect: Mood normal.         Behavior: Behavior normal.         Thought Content: Thought content normal.         Judgment: Judgment normal.         ED Course & MDM        Medical Decision Making  Differential diagnoses include dental carries and dental infection.         Procedure  Procedures     Corwin Goyal  02/20/24 1781

## 2024-08-29 ENCOUNTER — TELEPHONE (OUTPATIENT)
Dept: CARDIOLOGY | Facility: HOSPITAL | Age: 44
End: 2024-08-29
Payer: MEDICAID

## 2024-08-29 NOTE — TELEPHONE ENCOUNTER
Pt called to schedule appt. He was Dr. Mario Pt 3 years ago but would like to be seen by another physician and be able to be seen sooner. I made him a NPV for chest pain, heavy chest with Dr. Michaud for 9/10 @11:30am.    Jackie EWING

## 2024-09-05 PROBLEM — S92.062A CLOSED DISPLACED INTRAARTICULAR FRACTURE OF LEFT CALCANEUS: Status: ACTIVE | Noted: 2024-01-25

## 2024-09-05 PROBLEM — B19.20 HEPATITIS C VIRUS INFECTION: Status: ACTIVE | Noted: 2024-09-05

## 2024-09-05 PROBLEM — A41.9 SEPSIS, UNSPECIFIED ORGANISM (MULTI): Status: ACTIVE | Noted: 2018-12-28

## 2024-09-05 PROBLEM — I38 ENDOCARDITIS: Status: ACTIVE | Noted: 2019-01-18

## 2024-09-05 PROBLEM — E11.9 DIABETES (MULTI): Status: ACTIVE | Noted: 2024-09-05

## 2024-09-05 PROBLEM — K04.7 DENTAL ABSCESS: Status: ACTIVE | Noted: 2023-08-13

## 2024-09-05 PROBLEM — E11.9 TYPE 2 DIABETES MELLITUS WITHOUT COMPLICATIONS (MULTI): Status: ACTIVE | Noted: 2018-12-28

## 2024-09-05 PROBLEM — R93.89 ABNORMAL CXR: Status: ACTIVE | Noted: 2024-09-05

## 2024-09-05 PROBLEM — I26.90 SEPTIC PULMONARY EMBOLISM (MULTI): Status: ACTIVE | Noted: 2018-12-28

## 2024-09-05 PROBLEM — L98.499 NONHEALING SKIN ULCER (MULTI): Status: ACTIVE | Noted: 2024-09-05

## 2024-09-05 PROBLEM — L88 PYODERMA GANGRENOSUM (MULTI): Status: ACTIVE | Noted: 2018-10-10

## 2024-09-05 PROBLEM — G47.00 INSOMNIA: Status: ACTIVE | Noted: 2024-09-05

## 2024-09-05 PROBLEM — F17.200 TOBACCO DEPENDENCE SYNDROME: Status: ACTIVE | Noted: 2019-09-16

## 2024-09-05 PROBLEM — R00.2 PALPITATIONS: Status: ACTIVE | Noted: 2024-09-05

## 2024-09-05 PROBLEM — G47.10 HYPERSOMNIA WITH SLEEP APNEA: Status: ACTIVE | Noted: 2018-11-05

## 2024-09-05 PROBLEM — K21.9 GERD (GASTROESOPHAGEAL REFLUX DISEASE): Status: ACTIVE | Noted: 2024-09-05

## 2024-09-05 PROBLEM — R51.9 HEADACHE: Status: ACTIVE | Noted: 2024-09-05

## 2024-09-05 PROBLEM — I07.1 SEVERE TRICUSPID VALVE REGURGITATION: Status: ACTIVE | Noted: 2024-09-05

## 2024-09-05 PROBLEM — R26.2 DIFFICULTY IN WALKING, NOT ELSEWHERE CLASSIFIED: Status: ACTIVE | Noted: 2019-01-18

## 2024-09-05 PROBLEM — A49.1 INFECTION DUE TO ENTEROCOCCUS: Status: ACTIVE | Noted: 2024-09-05

## 2024-09-05 PROBLEM — G47.30 HYPERSOMNIA WITH SLEEP APNEA: Status: ACTIVE | Noted: 2018-11-05

## 2024-09-05 PROBLEM — M62.81 MUSCLE WEAKNESS (GENERALIZED): Status: ACTIVE | Noted: 2018-12-28

## 2024-09-05 PROBLEM — M86.9 OSTEOMYELITIS, UNSPECIFIED: Status: ACTIVE | Noted: 2019-01-18

## 2024-09-05 PROBLEM — B37.9 CANDIDIASIS: Status: ACTIVE | Noted: 2024-09-05

## 2024-09-05 PROBLEM — F43.20 ADJUSTMENT DISORDER: Status: ACTIVE | Noted: 2018-11-05

## 2024-09-05 PROBLEM — H53.8 BLURRING OF VISUAL IMAGE: Status: ACTIVE | Noted: 2023-05-09

## 2024-09-05 PROBLEM — R06.02 SHORTNESS OF BREATH ON EXERTION: Status: ACTIVE | Noted: 2024-09-05

## 2024-09-05 PROBLEM — J44.9 CHRONIC OBSTRUCTIVE PULMONARY DISEASE (MULTI): Status: ACTIVE | Noted: 2024-09-05

## 2024-09-05 PROBLEM — I10 HYPERTENSION: Status: ACTIVE | Noted: 2018-12-28

## 2024-09-05 PROBLEM — L97.909 ULCER OF LOWER EXTREMITY (MULTI): Status: ACTIVE | Noted: 2023-04-28

## 2024-09-05 PROBLEM — F19.21: Status: ACTIVE | Noted: 2018-11-05

## 2024-09-05 PROBLEM — L40.0 PLAQUE PSORIASIS: Status: ACTIVE | Noted: 2018-10-10

## 2024-09-05 RX ORDER — CHLORHEXIDINE GLUCONATE ORAL RINSE 1.2 MG/ML
SOLUTION DENTAL
COMMUNITY

## 2024-09-05 RX ORDER — METFORMIN HYDROCHLORIDE 500 MG/1
2 TABLET, EXTENDED RELEASE ORAL
COMMUNITY
Start: 2024-01-08

## 2024-09-05 RX ORDER — ACETAMINOPHEN 500 MG
1 TABLET ORAL DAILY
COMMUNITY
Start: 2024-01-08

## 2024-09-05 RX ORDER — INSULIN GLARGINE 100 [IU]/ML
INJECTION, SOLUTION SUBCUTANEOUS
COMMUNITY
Start: 2024-01-08

## 2024-09-05 RX ORDER — ERGOCALCIFEROL 1.25 MG/1
1 CAPSULE ORAL
COMMUNITY
Start: 2024-01-08

## 2024-09-05 RX ORDER — ACETAMINOPHEN 500 MG
TABLET ORAL
COMMUNITY

## 2024-09-05 RX ORDER — BLOOD-GLUCOSE,RECEIVER,CONT
EACH MISCELLANEOUS
COMMUNITY
Start: 2023-10-06

## 2024-09-05 RX ORDER — BLOOD-GLUCOSE TRANSMITTER
EACH MISCELLANEOUS
COMMUNITY
Start: 2023-10-06

## 2024-09-05 RX ORDER — CALCIUM CARBONATE 600 MG
1 TABLET ORAL DAILY
COMMUNITY
Start: 2024-01-08

## 2024-09-05 RX ORDER — IBUPROFEN 800 MG/1
1 TABLET ORAL 3 TIMES DAILY PRN
COMMUNITY
Start: 2024-01-08

## 2024-09-05 RX ORDER — INSULIN LISPRO 100 [IU]/ML
INJECTION, SOLUTION INTRAVENOUS; SUBCUTANEOUS
COMMUNITY
Start: 2024-01-08

## 2024-09-05 RX ORDER — BUPRENORPHINE AND NALOXONE 8; 2 MG/1; MG/1
FILM, SOLUBLE BUCCAL; SUBLINGUAL
COMMUNITY

## 2024-09-05 RX ORDER — ASPIRIN/CALCIUM CARB/MAGNESIUM 325 MG
TABLET ORAL
COMMUNITY
Start: 2024-01-08

## 2024-09-05 RX ORDER — LISINOPRIL 2.5 MG/1
1 TABLET ORAL DAILY
COMMUNITY
Start: 2024-01-09

## 2024-09-05 RX ORDER — IBUPROFEN 400 MG/1
1 TABLET ORAL EVERY 6 HOURS PRN
COMMUNITY
Start: 2023-11-22

## 2024-09-05 RX ORDER — BLOOD-GLUCOSE SENSOR
EACH MISCELLANEOUS
COMMUNITY
Start: 2023-10-06

## 2024-09-09 ENCOUNTER — TELEPHONE (OUTPATIENT)
Dept: CARDIOLOGY | Facility: HOSPITAL | Age: 44
End: 2024-09-09
Payer: MEDICAID

## 2024-09-10 ENCOUNTER — APPOINTMENT (OUTPATIENT)
Dept: CARDIOLOGY | Facility: CLINIC | Age: 44
End: 2024-09-10
Payer: MEDICAID

## 2024-09-10 ENCOUNTER — ANCILLARY PROCEDURE (OUTPATIENT)
Dept: CARDIOLOGY | Facility: HOSPITAL | Age: 44
End: 2024-09-10
Payer: MEDICAID

## 2024-09-10 VITALS
DIASTOLIC BLOOD PRESSURE: 82 MMHG | OXYGEN SATURATION: 94 % | HEIGHT: 75 IN | SYSTOLIC BLOOD PRESSURE: 130 MMHG | HEART RATE: 78 BPM | WEIGHT: 276.5 LBS | BODY MASS INDEX: 34.38 KG/M2

## 2024-09-10 DIAGNOSIS — E11.65 TYPE 2 DIABETES MELLITUS WITH HYPERGLYCEMIA, WITH LONG-TERM CURRENT USE OF INSULIN (MULTI): ICD-10-CM

## 2024-09-10 DIAGNOSIS — I38 CHRONIC INFECTIVE ENDOCARDITIS, DUE TO UNSPECIFIED ORGANISM: ICD-10-CM

## 2024-09-10 DIAGNOSIS — R07.9 CHEST PAIN, UNSPECIFIED TYPE: ICD-10-CM

## 2024-09-10 DIAGNOSIS — R55 SYNCOPE AND COLLAPSE: ICD-10-CM

## 2024-09-10 DIAGNOSIS — R55 SYNCOPE AND COLLAPSE: Primary | ICD-10-CM

## 2024-09-10 DIAGNOSIS — Z79.4 TYPE 2 DIABETES MELLITUS WITH HYPERGLYCEMIA, WITH LONG-TERM CURRENT USE OF INSULIN (MULTI): ICD-10-CM

## 2024-09-10 LAB — BODY SURFACE AREA: 2.58 M2

## 2024-09-10 PROCEDURE — 3008F BODY MASS INDEX DOCD: CPT | Performed by: STUDENT IN AN ORGANIZED HEALTH CARE EDUCATION/TRAINING PROGRAM

## 2024-09-10 PROCEDURE — 99205 OFFICE O/P NEW HI 60 MIN: CPT | Performed by: STUDENT IN AN ORGANIZED HEALTH CARE EDUCATION/TRAINING PROGRAM

## 2024-09-10 PROCEDURE — 93000 ELECTROCARDIOGRAM COMPLETE: CPT | Performed by: STUDENT IN AN ORGANIZED HEALTH CARE EDUCATION/TRAINING PROGRAM

## 2024-09-10 PROCEDURE — 3079F DIAST BP 80-89 MM HG: CPT | Performed by: STUDENT IN AN ORGANIZED HEALTH CARE EDUCATION/TRAINING PROGRAM

## 2024-09-10 PROCEDURE — 93246 EXT ECG>7D<15D RECORDING: CPT

## 2024-09-10 PROCEDURE — 3075F SYST BP GE 130 - 139MM HG: CPT | Performed by: STUDENT IN AN ORGANIZED HEALTH CARE EDUCATION/TRAINING PROGRAM

## 2024-09-10 PROCEDURE — 4010F ACE/ARB THERAPY RXD/TAKEN: CPT | Performed by: STUDENT IN AN ORGANIZED HEALTH CARE EDUCATION/TRAINING PROGRAM

## 2024-09-10 NOTE — PROGRESS NOTES
[facility] – [place], [place] Heart and Vascular Cardiology Clinic Note    Date: 09/10/24  Time: 12:56 PM    Subjective   Emi Villalta is a 44 y.o. male who is coming to cardiology clinic for fluttering sensation/chest discomfort and syncope.     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 His PMH is significant for h/o IV drug use, hypertension, diabetes, Hepatitis C, GERD, endocarditis of the tricuspid valve, plaque psoriasis with pyoderma gangrenosum, severe tricuspid valve regurgitation and septic PE. Patient is heavy smoker.    Patient was lost to follow up. Patient had been previously seeing CV surgery for surgical evaluation but didn't f/up with them. He was also referred to endocrine for IDDM but didn't f/up.     Patient reports that lately he is having Fluttering sensation all the time. He also reports few episodes of confusion/AMS and syncope. First time he had confusion while sitting down. Another event when he reported feeling confusion and altered mentation. One episode last week when he was working in yard and felt dizzy and passed out. No palpitation before passing out, no chest pain at that time, no sob, no tongue bite or urinary/bowel incontinence.     He continues to smoke heavy.     Smoking- pack a day, lifetime smoker  Drugs- past IVDU, denies using it now  ETOH-denies  FH- CAD in family      Review of Systems:  Otherwise, limited cardiovascular review of systems is negative.        Medical History:   He has a past medical history of Headache, unspecified and Personal history of diseases of the skin and subcutaneous tissue.  Surgical History:   Past Surgical History:   Procedure Laterality Date    IR CVC TUNNELED  12/4/2018    IR CVC TUNNELED 12/4/2018 [facility] CLINICAL LEGACY   PSHP@  Social History:   Social Determinants of Health with Concerns     Tobacco Use: High Risk (9/10/2024)    Patient History      Smoking Tobacco Use: Every Day     Smokeless Tobacco Use: Never     Passive Exposure: Not on file   Financial Resource Strain: Not on file   Food Insecurity: Not on file   Transportation Needs: Not on file   Physical Activity: Not on file   Stress: Not on file   Social Connections: Not on file   Intimate Partner Violence: Not on file   Depression: Not on file   Housing Stability: Not on file   Utilities: Not on file   Digital Equity: Not on file   Health Literacy: Not on file     Family History:   No family history on file.   Allergies:  Patient has no known allergies.    Outpatient Medications:  Current Outpatient Medications   Medication Instructions    acetaminophen (Tylenol) 500 mg tablet TAKE TWO TABLETS BY MOUTH EVERY 8 HOURS AS NEEDED FOR MODERATE PAIN (4-6) OR FEVER (TEMP GREATER THAN 38.0 C) FOR UP TO 5 DAYS    buprenorphine-naloxone (Suboxone) 8-2 mg SL film DISSOLVE 2.5 FILMS UNDER THE TONGUE DAILY    calcium carbonate 600 mg calcium (1,500 mg) tablet 1 tablet, oral, Daily    chlorhexidine (Peridex) 0.12 % solution SWISH AND SPIT 15MLS TWICE A DAY FOR 14 DAYS    cholecalciferol (Vitamin D-3) 50 mcg (2,000 unit) capsule 1 capsule, oral, Daily    Dexcom G6  misc use as directed    Dexcom G6 Sensor device use as directed    Dexcom G6 Transmitter device use as directed    ergocalciferol (Vitamin D-2) 1.25 MG (47725 UT) capsule 1 capsule, oral, Once Weekly    HumaLOG KwikPen Insulin 100 unit/mL injection INJECT 5 UNITS SUBCUTANEOUSLY 3 TIMES A DAY (BEFORE MEALS)    ibuprofen 400 mg tablet 1 tablet, oral, Every 6 hours PRN    ibuprofen 800 mg tablet 1 tablet, oral, 3 times daily PRN    Lantus Solostar U-100 Insulin 100 unit/mL (3 mL) pen INJECT 60 UNITS SUBCUTANEOUSLY TWICE A DAY    lisinopril 2.5 mg tablet 1 tablet, oral, Daily    metFORMIN  mg 24 hr tablet 2 tablets, oral, Every 12 hours scheduled (0630,1830)    nicotine polacrilex (Commit) 4 mg lozenge TAKE ONE TABLET BY MOUTH EVERY 2 HOURS AS  "NEEDED       Objective     Physical Exam  Vitals:    09/10/24 1138   BP: 130/82   BP Location: Left arm   Patient Position: Sitting   BP Cuff Size: Large adult   Pulse: 78   SpO2: 94%   Weight: 125 kg (276 lb 8 oz)   Height: 1.905 m (6' 3\")     Wt Readings from Last 3 Encounters:   09/10/24 125 kg (276 lb 8 oz)   02/20/24 127 kg (280 lb)   07/06/21 (!) 140 kg (309 lb)       General: Alert and Oriented, No distress, cooperative  Head: Normocephalic without obvious abnormality, atraumatic  Eyes: Conjunctiva/corneas clear, EOM's grossly intact  Neck: Supple, trachea midline, No thyroid enlargement/tenderness/nodules; No JVD  Lungs: Clear to auscultation bilaterally, no wheezes, rhonci, or rales. respirations unlabored  Chest Wall: No tenderness or deformity  Heart: Regular rhythm, normal S1/S2, no murmur  Abdomen: Soft, non-tender, Non-distended, bowel sounds active  Extremities: No edema, no cyanosis, no clubbing  Skin: Skin color, texture, turgor normal.  b/l psoritic leison noted on chest/abdomen/arms  Neurologic: Alert and oriented x 3, grossly moving all extremities, speech intact        I have personally reviewed the following images and laboratory findings:  ECG: NSR, borderline left axis deviation, NSST  Echocardiogram:   TTE 06/1/21     PHYSICIAN INTERPRETATION:  Left Ventricle: The left ventricular systolic function is normal, with an estimated ejection fraction of 60-65%. There are no regional wall motion abnormalities. The left ventricular cavity size is normal. Spectral Doppler shows a normal pattern of left ventricular diastolic filling.  Left Atrium: The left atrium is normal in size.  Right Ventricle: The right ventricle is normal in size. There is normal right ventricular global systolic function.  Right Atrium: The right atrium is normal in size.  Aortic Valve: The aortic valve is trileaflet. There is no evidence of aortic valve regurgitation. The peak instantaneous gradient of the aortic valve is 4.8 " mmHg.  Mitral Valve: The mitral valve is normal in structure. There is no evidence of mitral valve regurgitation.  Tricuspid Valve: The tricuspid valve is structurally normal. There is trace to mild tricuspid regurgitation. The Doppler estimated RVSP is mildly elevated at 41.2 mmHg.  Pulmonic Valve: The pulmonic valve is structurally normal. There is no indication of pulmonic valve regurgitation.  Pericardium: There is no pericardial effusion noted.  Aorta: The aortic root is normal.  Pulmonary Artery: The tricuspid regurgitant velocity is 3.09 m/s, and with an estimated right atrial pressure of 3 mmHg, the estimated pulmonary artery pressure is mildly elevated with the RVSP at 35.7 mmHg.  Systemic Veins: The inferior vena cava appears to be of normal size. There is IVC inspiratory collapse greater than 50%.  In comparison to the previous echocardiogram(s): TV vegetation seen in 2018 not evident on this study.        CONCLUSIONS:   1. The left ventricular systolic function is normal with a 60-65% estimated ejection fraction.   2. Mildly elevated RVSP.    Stress Test  Summary:   1. The resting ejection fraction was estimated at 55 to 60% with a peak exercise ejection fraction estimated at 60 to 65%.   2. Normal global left ventricular systolic function.   3. The adequate level of stress was achieved.   4. There were no stress-induced wall motion abnormalities. This is a negative stress echo test for ischemia.    Laboratory values:   No visits with results within 2 Month(s) from this visit.   Latest known visit with results is:   [facility] Encounter on 02/15/2019   Component Date Value    Ventricular Rate 02/15/2019 95     Atrial Rate 02/15/2019 95     NV Interval 02/15/2019 146     QRS Duration 02/15/2019 88     QT Interval 02/15/2019 350     QTC Calculation(Bazett) 02/15/2019 439     P Axis 02/15/2019 60     R [place] 02/15/2019 11     T Axis 02/15/2019 56     QRS Count 02/15/2019 15     Q Onset 02/15/2019 222     P Onset  02/15/2019 149     P Offset 02/15/2019 194     T Offset 02/15/2019 397     QTC [place] 02/15/2019 407      CBC -  Lab Results   Component Value Date    WBC 6.5 01/14/2019    HGB 9.2 (L) 01/14/2019    HCT 31.0 (L) 01/14/2019    MCV 79 (L) 01/14/2019     01/14/2019       CMP -  Lab Results   Component Value Date    CALCIUM 10.0 01/14/2019    PHOS 5.0 (H) 12/28/2018    PROT 8.5 (H) 01/14/2019    ALBUMIN 3.8 01/14/2019    AST 15 01/14/2019    ALT 14 01/14/2019    ALKPHOS 102 01/14/2019    BILITOT 0.4 01/14/2019       LIPID PANEL -   Lab Results   Component Value Date    CHOL 113 12/04/2018    HDL 8.7 (A) 12/04/2018    CHHDL 13.0 (A) 12/04/2018    VLDL 41 (H) 12/04/2018    TRIG 206 (H) 12/04/2018    NHDL 104 12/04/2018       RENAL FUNCTION PANEL -   Lab Results   Component Value Date    K 4.4 01/14/2019    PHOS 5.0 (H) 12/28/2018       Lab Results   Component Value Date    HGBA1C 7.1 12/08/2018        Assessment/Plan   H/O Infective Endocarditis  Palpitation  Syncope & Collapse  Tobacco abuse   H/O IVDU  Medical Non compliance  IDDM    Plan:  -I discussed with patient regarding medical compliance. Patient previously lost to f/up with cardiology, cardiac surgery and ID. He was following them for TV endocarditis. Patient in agreement that he will follow up.   -I will repeat TTE to evaluation for TV and RV dysfunction.  -I will order Holter to evaluate for arrythmia  -Will check BMP, CBC  -Refer to endocrine for IDDM  -Encourage smoking cessation     Follow to f/up with primary cardiologist.     In addition, the following orders were placed today:  Orders Placed This Encounter   Procedures    Basic metabolic panel    CBC    Referral to Endocrinology    Holter Or Event Cardiac Monitor    ECG 12 Lead    Transthoracic Echo (TTE) Complete                 SIGNATURE: Erwin Michaud MD PATIENT NAME: Emi Villalta   DATE/TIME: September 10, 2024 12:56 PM MRN: [id number]

## 2024-10-09 LAB — BODY SURFACE AREA: 2.58 M2

## 2024-10-10 ENCOUNTER — TELEPHONE (OUTPATIENT)
Dept: CARDIOLOGY | Facility: HOSPITAL | Age: 44
End: 2024-10-10
Payer: MEDICAID

## 2024-10-10 NOTE — TELEPHONE ENCOUNTER
10/10/24  1051  Called results and follow up directive to patient with patient verbalizing understanding; gave date and time of appt with Dr. Mario to patient.      ----- Message from Erwin Michaud sent at 10/9/2024  5:50 PM EDT -----  Some minor abnormalities, see me in clinic to discuss.  ----- Message -----  From: Erwin Michaud MD  Sent: 10/9/2024   5:48 PM EDT  To: Erwin Michaud MD

## 2024-10-12 PROBLEM — E87.5 HYPERKALEMIA: Status: ACTIVE | Noted: 2024-10-12

## 2024-10-17 ENCOUNTER — APPOINTMENT (OUTPATIENT)
Dept: CARDIOLOGY | Facility: HOSPITAL | Age: 44
End: 2024-10-17
Payer: MEDICAID

## 2025-01-20 ENCOUNTER — APPOINTMENT (OUTPATIENT)
Dept: ENDOCRINOLOGY | Facility: CLINIC | Age: 45
End: 2025-01-20
Payer: MEDICAID

## 2025-02-27 ENCOUNTER — APPOINTMENT (OUTPATIENT)
Dept: ENDOCRINOLOGY | Facility: CLINIC | Age: 45
End: 2025-02-27
Payer: MEDICAID

## 2025-03-18 NOTE — PROGRESS NOTES
Patient is sent at the request of No ref. provider found for my opinion regarding Type 2 diabetes.  My final recommendations will be communicated back to the requesting provider by way of shared medical record.    Subjective   Emi Villalta is a 45 y.o. male who presents for initial visit for evaluation of Type 2 diabetes mellitus. The initial diagnosis of diabetes was made  in 2022 . The patient does not have a known family history of diabetes.    He admits to having pyoderma grangrenosum and psoriasis.  He was on steroids for psoriasis previously.      He had endocarditis of the tricuspid valve 3 years ago and septic PE.  He has a history of IV drug abuse and hepatitis C.      Known complications due to diabetes included obesity    Cardiovascular risk factors include diabetes mellitus, family history of premature cardiovascular disease, male gender, and obesity (BMI >= 30 kg/m2). The patient is on an ACE inhibitor or angiotensin II receptor blocker.  The patient has not been previously hospitalized due to diabetic ketoacidosis.     Current symptoms/problems include foot ulcerations and hyperglycemia. His clinical course has been stable.     Current diabetes regimen is as follows:   Lantus 70 units subcutaneous bedtime      The patient is currently checking the blood glucose.      Patient is using: glucometer  He states that his glucose value is typically 500mg/dL.  He states that he is never below 250mg/dL   Today, it was 600mg/dL     Hypoglycemia frequency: Denies   Hypoglycemia awareness: N/A     MEALS:  Breakfast - omits  Lunch - chicken wings, gas station items    Dinner 8pm to 2am - hamburger, steak, french fries, 6 sandwiches   Snacks -   Beverages - apple juice, milk 3-4 gallons per day, gatoradte zero      Review of Systems   Constitutional:  Positive for chills, fatigue and unexpected weight change (Fluctuates 15 lbs). Negative for fever.   Eyes:  Positive for visual disturbance.   Cardiovascular:   "Positive for palpitations.   Gastrointestinal:  Positive for diarrhea and vomiting. Negative for nausea.   Endocrine: Positive for polydipsia.   Genitourinary:         Nocturia x 2   Neurological:  Positive for syncope.   All other systems reviewed and are negative.      Objective   /70   Pulse 86   Ht 1.905 m (6' 3\")   Wt 118 kg (260 lb)   BMI 32.50 kg/m²   Physical Exam  Vitals and nursing note reviewed.   Constitutional:       General: He is not in acute distress.     Appearance: Normal appearance. He is obese.   HENT:      Head: Normocephalic and atraumatic.      Nose: Nose normal.      Mouth/Throat:      Mouth: Mucous membranes are moist.   Eyes:      Extraocular Movements: Extraocular movements intact.   Cardiovascular:      Rate and Rhythm: Normal rate and regular rhythm.   Pulmonary:      Effort: Pulmonary effort is normal.      Breath sounds: Normal breath sounds.   Musculoskeletal:         General: Normal range of motion.   Skin:     General: Skin is warm.      Comments: Dressings to bilateral legs in situ   Stain to right lateral aspect of wound dressing  Scars to bilateral legs from pyoderma granulosum    Neurological:      Mental Status: He is alert and oriented to person, place, and time.   Psychiatric:         Mood and Affect: Mood normal.         Lab Review  Glucose (mg/dL)   Date Value   01/14/2019 94   12/28/2018 128 (H)   12/27/2018 104 (H)     Hemoglobin A1C (%)   Date Value   12/08/2018 7.1     Bicarbonate (mmol/L)   Date Value   01/14/2019 31   12/28/2018 29   12/27/2018 28     Urea Nitrogen (mg/dL)   Date Value   01/14/2019 12   12/28/2018 29 (H)   12/27/2018 32 (H)     Creatinine (mg/dL)   Date Value   01/14/2019 0.73   12/28/2018 1.48 (H)   12/27/2018 1.32 (H)     Lab Results   Component Value Date    CHOL 113 12/04/2018     Lab Results   Component Value Date    HDL 8.7 (A) 12/04/2018     No results found for: \"LDLCALC\"  Lab Results   Component Value Date    TRIG 206 (H) 12/04/2018 "     Health Maintenance:   Foot Exam:   Eye Exam:  2024  Urine Albumin:    Assessment/Plan    45-year-old male presents for the evaluation of for the management of poorly controlled type 2 diabetes mellitus.  His blood pressure is at goal today.    Type 2 diabetes mellitus with skin complication, with long-term current use of insulin  To continue Lantus 70 units subcutaneous bedtime  To commence Humalog 12 units three times daily before meals  Please commence an insulin sliding scale with Humalog before meals or if high as follows:   150-200mg/dL - 2 units   201-250mg/dL - 4 units   251-300 mg/dL - 6 untis   301-350mg/dL - 8 units   >351mg/dL - 10 units  To obtain fasting blood and urine tests  Counseled that the goal A1C should be 7% or less  Counseled glycemic control is warranted to prevent microvascular complications  For a diabetic dilated eye examination  Please rotate insulin injection sites  Clinical pharmacy referral for glucose log review in 1-2 weeks   Nutrition referral   For follow up in 3 months     English

## 2025-03-18 NOTE — PATIENT INSTRUCTIONS
Thank you for choosing [facility] Endocrinology  for your health care needs.  If you have any questions, concerns or medical needs, please feel free to contact our office at [phone].    Please ensure you complete your blood work one week before the next scheduled appointment.    To continue Lantus 70 units subcutaneous bedtime  To commence Humalog 12 units three times daily before meals  Please commence an insulin sliding scale with Humalog before meals or if high as follows:   150-200mg/dL - 2 units   201-250mg/dL - 4 units   251-300 mg/dL - 6 untis   301-350mg/dL - 8 units   >351mg/dL - 10 units  To obtain fasting blood and urine tests  Counseled that the goal A1C should be 7% or less  Counseled glycemic control is warranted to prevent microvascular complications  For a diabetic dilated eye examination  Clinical pharmacy referral for glucose log review in 1-2 weeks   Nutrition referral   For follow up in 3 months

## 2025-03-19 ENCOUNTER — APPOINTMENT (OUTPATIENT)
Dept: ENDOCRINOLOGY | Facility: CLINIC | Age: 45
End: 2025-03-19
Payer: MEDICAID

## 2025-03-19 VITALS
HEART RATE: 86 BPM | WEIGHT: 260 LBS | DIASTOLIC BLOOD PRESSURE: 70 MMHG | HEIGHT: 75 IN | SYSTOLIC BLOOD PRESSURE: 110 MMHG | BODY MASS INDEX: 32.33 KG/M2

## 2025-03-19 DIAGNOSIS — E11.69 TYPE 2 DIABETES MELLITUS WITH OTHER SPECIFIED COMPLICATION, WITH LONG-TERM CURRENT USE OF INSULIN: Primary | ICD-10-CM

## 2025-03-19 DIAGNOSIS — Z79.4 TYPE 2 DIABETES MELLITUS WITH OTHER SPECIFIED COMPLICATION, WITH LONG-TERM CURRENT USE OF INSULIN: Primary | ICD-10-CM

## 2025-03-19 LAB — POC FINGERSTICK BLOOD GLUCOSE: 304 MG/DL (ref 70–100)

## 2025-03-19 PROCEDURE — 4010F ACE/ARB THERAPY RXD/TAKEN: CPT | Performed by: INTERNAL MEDICINE

## 2025-03-19 PROCEDURE — 82962 GLUCOSE BLOOD TEST: CPT | Performed by: INTERNAL MEDICINE

## 2025-03-19 PROCEDURE — 3074F SYST BP LT 130 MM HG: CPT | Performed by: INTERNAL MEDICINE

## 2025-03-19 PROCEDURE — 3078F DIAST BP <80 MM HG: CPT | Performed by: INTERNAL MEDICINE

## 2025-03-19 PROCEDURE — 3008F BODY MASS INDEX DOCD: CPT | Performed by: INTERNAL MEDICINE

## 2025-03-19 PROCEDURE — 99204 OFFICE O/P NEW MOD 45 MIN: CPT | Performed by: INTERNAL MEDICINE

## 2025-03-19 RX ORDER — PEN NEEDLE, DIABETIC 32GX 5/32"
1 NEEDLE, DISPOSABLE MISCELLANEOUS
Qty: 200 EACH | Refills: 11 | Status: SHIPPED | OUTPATIENT
Start: 2025-03-19 | End: 2026-03-19

## 2025-03-19 RX ORDER — INSULIN LISPRO 100 [IU]/ML
12 INJECTION, SOLUTION INTRAVENOUS; SUBCUTANEOUS
Qty: 30 ML | Refills: 5 | Status: SHIPPED | OUTPATIENT
Start: 2025-03-19 | End: 2025-09-15

## 2025-03-19 ASSESSMENT — ENCOUNTER SYMPTOMS
NAUSEA: 0
FATIGUE: 1
VOMITING: 1
CHILLS: 1
FEVER: 0
PALPITATIONS: 1
UNEXPECTED WEIGHT CHANGE: 1
DIARRHEA: 1
POLYDIPSIA: 1

## 2025-03-22 PROBLEM — E11.628 TYPE 2 DIABETES MELLITUS WITH SKIN COMPLICATION, WITH LONG-TERM CURRENT USE OF INSULIN: Status: ACTIVE | Noted: 2024-09-05

## 2025-03-22 PROBLEM — Z79.4 TYPE 2 DIABETES MELLITUS WITH SKIN COMPLICATION, WITH LONG-TERM CURRENT USE OF INSULIN: Status: ACTIVE | Noted: 2024-09-05

## 2025-03-22 RX ORDER — INSULIN GLARGINE 100 [IU]/ML
70 INJECTION, SOLUTION SUBCUTANEOUS NIGHTLY
Qty: 45 ML | Refills: 5 | Status: SHIPPED | OUTPATIENT
Start: 2025-03-22 | End: 2025-09-18
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
## 2025-03-23 NOTE — ASSESSMENT & PLAN NOTE
To continue Lantus 70 units subcutaneous bedtime  To commence Humalog 12 units three times daily before meals  Please commence an insulin sliding scale with Humalog before meals or if high as follows:   150-200mg/dL - 2 units   201-250mg/dL - 4 units   251-300 mg/dL - 6 untis   301-350mg/dL - 8 units   >351mg/dL - 10 units  To obtain fasting blood and urine tests  Counseled that the goal A1C should be 7% or less  Counseled glycemic control is warranted to prevent microvascular complications  For a diabetic dilated eye examination  Please rotate insulin injection sites  Clinical pharmacy referral for glucose log review in 1-2 weeks   Nutrition referral   For follow up in 3 months

## 2025-03-24 ENCOUNTER — APPOINTMENT (OUTPATIENT)
Dept: ENDOCRINOLOGY | Facility: CLINIC | Age: 45
End: 2025-03-24
Payer: MEDICAID

## 2025-03-31 ENCOUNTER — TELEMEDICINE (OUTPATIENT)
Dept: ENDOCRINOLOGY | Facility: CLINIC | Age: 45
End: 2025-03-31
Payer: MEDICAID

## 2025-03-31 DIAGNOSIS — E11.69 TYPE 2 DIABETES MELLITUS WITH OTHER SPECIFIED COMPLICATION, WITH LONG-TERM CURRENT USE OF INSULIN: Primary | ICD-10-CM

## 2025-03-31 DIAGNOSIS — Z79.4 TYPE 2 DIABETES MELLITUS WITH OTHER SPECIFIED COMPLICATION, WITH LONG-TERM CURRENT USE OF INSULIN: Primary | ICD-10-CM

## 2025-03-31 PROCEDURE — 99211 OFF/OP EST MAY X REQ PHY/QHP: CPT

## 2025-03-31 RX ORDER — BLOOD-GLUCOSE SENSOR
EACH MISCELLANEOUS
Qty: 2 EACH | Refills: 11 | Status: SHIPPED | OUTPATIENT
Start: 2025-03-31
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
## 2025-04-07 ENCOUNTER — APPOINTMENT (OUTPATIENT)
Dept: ENDOCRINOLOGY | Facility: CLINIC | Age: 45
End: 2025-04-07
Payer: MEDICAID

## 2025-04-07 DIAGNOSIS — E11.69 TYPE 2 DIABETES MELLITUS WITH OTHER SPECIFIED COMPLICATION, WITH LONG-TERM CURRENT USE OF INSULIN: Primary | ICD-10-CM

## 2025-04-07 DIAGNOSIS — Z79.4 TYPE 2 DIABETES MELLITUS WITH OTHER SPECIFIED COMPLICATION, WITH LONG-TERM CURRENT USE OF INSULIN: Primary | ICD-10-CM
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
## 2025-04-08 RX ORDER — INSULIN GLARGINE 100 [IU]/ML
82 INJECTION, SOLUTION SUBCUTANEOUS 2 TIMES DAILY
Qty: 150 ML | Refills: 1 | Status: SHIPPED | OUTPATIENT
Start: 2025-04-08 | End: 2025-10-08

## 2025-04-25 ENCOUNTER — APPOINTMENT (OUTPATIENT)
Dept: ENDOCRINOLOGY | Facility: CLINIC | Age: 45
End: 2025-04-25
Payer: MEDICAID
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
## 2025-05-05 ENCOUNTER — APPOINTMENT (OUTPATIENT)
Dept: ENDOCRINOLOGY | Facility: CLINIC | Age: 45
End: 2025-05-05
Payer: MEDICAID

## 2025-05-05 DIAGNOSIS — Z79.4 TYPE 2 DIABETES MELLITUS WITH OTHER SPECIFIED COMPLICATION, WITH LONG-TERM CURRENT USE OF INSULIN: ICD-10-CM

## 2025-05-05 DIAGNOSIS — E11.69 TYPE 2 DIABETES MELLITUS WITH OTHER SPECIFIED COMPLICATION, WITH LONG-TERM CURRENT USE OF INSULIN: ICD-10-CM

## 2025-05-21 ENCOUNTER — APPOINTMENT (OUTPATIENT)
Dept: PHARMACY | Facility: HOSPITAL | Age: 45
End: 2025-05-21
Payer: MEDICAID

## 2025-06-04 ENCOUNTER — APPOINTMENT (OUTPATIENT)
Dept: PHARMACY | Facility: HOSPITAL | Age: 45
End: 2025-06-04
Payer: MEDICAID

## 2025-07-16 ENCOUNTER — OFFICE VISIT (OUTPATIENT)
Dept: WOUND CARE | Facility: CLINIC | Age: 45
End: 2025-07-16
Payer: MEDICAID

## 2025-07-16 PROCEDURE — 99214 OFFICE O/P EST MOD 30 MIN: CPT

## 2025-08-22 ENCOUNTER — APPOINTMENT (OUTPATIENT)
Dept: ENDOCRINOLOGY | Facility: CLINIC | Age: 45
End: 2025-08-22
Payer: MEDICAID